# Patient Record
Sex: MALE | Race: WHITE | NOT HISPANIC OR LATINO | Employment: OTHER | URBAN - METROPOLITAN AREA
[De-identification: names, ages, dates, MRNs, and addresses within clinical notes are randomized per-mention and may not be internally consistent; named-entity substitution may affect disease eponyms.]

---

## 2018-04-28 ENCOUNTER — HOSPITAL ENCOUNTER (OUTPATIENT)
Dept: PREADMISSION TESTING | Facility: HOSPITAL | Age: 53
Discharge: HOME | End: 2018-04-28
Attending: SURGERY | Admitting: SURGERY

## 2018-04-28 LAB
ANION GAP SERPL CALC-SCNC: 11.8 MMOL/L (ref 10–20)
BUN SERPL-MCNC: 6 MG/DL (ref 8–20)
BUN/CREAT SERPL: 6.7 (ref 6.2–20.3)
CALCIUM SERPL-MCNC: 8.6 MG/DL (ref 8.9–10.3)
CHLORIDE SERPL-SCNC: 101 MMOL/L (ref 101–111)
CONV CO2: 25 MMOL/L (ref 22–32)
CREAT UR-MCNC: 0.9 MG/DL (ref 0.7–1.2)
GLUCOSE SERPL-MCNC: 101 MG/DL (ref 65–99)
POTASSIUM SERPL-SCNC: 3.8 MMOL/L (ref 3.6–5.1)
SODIUM SERPL-SCNC: 134 MMOL/L (ref 136–144)

## 2019-09-09 ENCOUNTER — APPOINTMENT (OUTPATIENT)
Dept: ULTRASOUND IMAGING | Facility: HOSPITAL | Age: 54
End: 2019-09-09

## 2019-09-09 ENCOUNTER — HOSPITAL ENCOUNTER (EMERGENCY)
Facility: HOSPITAL | Age: 54
Discharge: HOME | End: 2019-09-10
Attending: EMERGENCY MEDICINE | Admitting: EMERGENCY MEDICINE

## 2019-09-09 DIAGNOSIS — K40.91 RECURRENT RIGHT INGUINAL HERNIA: Primary | ICD-10-CM

## 2019-09-09 DIAGNOSIS — R33.9 URINARY RETENTION WITH INCOMPLETE BLADDER EMPTYING: ICD-10-CM

## 2019-09-09 LAB
BILIRUB UR QL STRIP: NEGATIVE
CLARITY UR: CLEAR
COLOR UR: YELLOW
GLUCOSE UR STRIP-MCNC: NEGATIVE MG/DL
HGB UR QL STRIP.AUTO: NEGATIVE
KETONES UR QL STRIP: NEGATIVE
LEUKOCYTE ESTERASE UR QL STRIP.AUTO: NEGATIVE
NITRITE UR QL STRIP: NEGATIVE
PH UR STRIP.AUTO: 6.5 [PH] (ref 5–8)
PROT UR QL STRIP: NEGATIVE
SP GR UR STRIP: 1.01 (ref 1–1.03)
UROBILINOGEN UR QL STRIP: NORMAL

## 2019-09-09 PROCEDURE — 99283 EMERGENCY DEPT VISIT LOW MDM: CPT

## 2019-09-09 PROCEDURE — 51798 US URINE CAPACITY MEASURE: CPT

## 2019-09-09 PROCEDURE — 76870 US EXAM SCROTUM: CPT

## 2019-09-09 PROCEDURE — 81003 URINALYSIS AUTO W/O SCOPE: CPT | Performed by: EMERGENCY MEDICINE

## 2019-09-10 VITALS
TEMPERATURE: 98.3 F | BODY MASS INDEX: 20.9 KG/M2 | SYSTOLIC BLOOD PRESSURE: 111 MMHG | RESPIRATION RATE: 18 BRPM | HEIGHT: 69 IN | WEIGHT: 141.09 LBS | OXYGEN SATURATION: 97 % | HEART RATE: 85 BPM | DIASTOLIC BLOOD PRESSURE: 71 MMHG

## 2019-09-10 RX ORDER — HYDROCODONE BITARTRATE AND ACETAMINOPHEN 10; 325 MG/1; MG/1
1 TABLET ORAL EVERY 6 HOURS PRN
Qty: 16 TABLET | Refills: 0 | Status: SHIPPED | OUTPATIENT
Start: 2019-09-10 | End: 2019-12-29

## 2019-09-10 NOTE — ED PROVIDER NOTES
Subjective   History of Present Illness  53-year-old male states that he was having intercourse 3 days ago felt something pop in the right groin area he states that he has had difficulties with urination but is not having dysuria or hematuria.  Patient has had a previous herniorrhaphy and he thinks that the hernias are recurrent.  Patient denies any fever or chills.  There is been no hematuria.  Review of Systems    No past medical history on file.    No Known Allergies    No past surgical history on file.    No family history on file.    Social History     Socioeconomic History   • Marital status:      Spouse name: Not on file   • Number of children: Not on file   • Years of education: Not on file   • Highest education level: Not on file           Objective   Physical Exam  On exam he is awake and alert he is afebrile vital signs are stable he has a scar in the right lower inguinal area from prior herniorrhaphy.  There is evidence of a recurrent right direct inguinal hernia which is not incarcerated at this point in time.  He has an old scar in the left groin from a prior herniorrhaphy.  There is tenderness in this area but I do not appreciate a direct hernia.  The patient has some tenderness over the right epididymis but no tenderness in the testicle itself.  There is no discoloration.  The patient has no drainage or discharge from the meatus.  Procedures           ED Course        Results for orders placed or performed during the hospital encounter of 09/09/19   Urinalysis With Microscopic If Indicated (No Culture) - Urine, Clean Catch   Result Value Ref Range    Color, UA Yellow Yellow, Straw    Appearance, UA Clear Clear    pH, UA 6.5 5.0 - 8.0    Specific Gravity, UA 1.007 1.005 - 1.030    Glucose, UA Negative Negative    Ketones, UA Negative Negative    Bilirubin, UA Negative Negative    Blood, UA Negative Negative    Protein, UA Negative Negative    Leuk Esterase, UA Negative Negative    Nitrite, UA  Negative Negative    Urobilinogen, UA 0.2 E.U./dL 0.2 - 1.0 E.U./dL     Medications - No data to display  Us Scrotum & Testicles    Result Date: 9/9/2019  1. Normal exam. Electronically signed by:  Pan Perdomo M.D.  9/9/2019 10:37 PM              MDM  The patient did not show evidence of epididymitis or scrotal pathology.  The patient did have a slight residual of 190 cc after voiding.  The patient does have evidence of a recurrent right inguinal hernia.  The patient states that he has talked to 1 of the general surgeons about a repair of the recurrent hernia and will follow up with him tomorrow.  The patient also is to follow-up with urology.  The patient is given hydrocodone to help with pain acutely and will be off work for 2 days.  Final diagnoses:   Recurrent right inguinal hernia   Urinary retention with incomplete bladder emptying              Jd Thrasher MD  09/10/19 0050

## 2019-09-10 NOTE — DISCHARGE INSTRUCTIONS
Off work Monday and Tuesday.  Call your general surgeon on Tuesday to set up an appointment to discuss hernia repair.  Hydrocodone for pain.  You should also [facility] to discuss the urinary retention

## 2019-12-26 ENCOUNTER — ON CAMPUS - OUTPATIENT (AMBULATORY)
Dept: URBAN - METROPOLITAN AREA HOSPITAL 2 | Facility: HOSPITAL | Age: 54
End: 2019-12-26
Payer: MEDICAID

## 2019-12-26 ENCOUNTER — OFFICE (AMBULATORY)
Dept: URBAN - METROPOLITAN AREA PATHOLOGY 4 | Facility: PATHOLOGY | Age: 54
End: 2019-12-26
Payer: MEDICAID

## 2019-12-26 VITALS
HEART RATE: 76 BPM | SYSTOLIC BLOOD PRESSURE: 119 MMHG | DIASTOLIC BLOOD PRESSURE: 77 MMHG | OXYGEN SATURATION: 98 % | HEART RATE: 87 BPM | RESPIRATION RATE: 16 BRPM | SYSTOLIC BLOOD PRESSURE: 96 MMHG | SYSTOLIC BLOOD PRESSURE: 92 MMHG | DIASTOLIC BLOOD PRESSURE: 74 MMHG | TEMPERATURE: 97.8 F | SYSTOLIC BLOOD PRESSURE: 121 MMHG | HEIGHT: 69 IN | HEART RATE: 100 BPM | HEART RATE: 97 BPM | DIASTOLIC BLOOD PRESSURE: 55 MMHG | DIASTOLIC BLOOD PRESSURE: 66 MMHG | DIASTOLIC BLOOD PRESSURE: 98 MMHG | HEART RATE: 85 BPM | HEART RATE: 89 BPM | DIASTOLIC BLOOD PRESSURE: 62 MMHG | WEIGHT: 153.4 LBS | OXYGEN SATURATION: 96 % | SYSTOLIC BLOOD PRESSURE: 108 MMHG | HEART RATE: 83 BPM | DIASTOLIC BLOOD PRESSURE: 64 MMHG | DIASTOLIC BLOOD PRESSURE: 76 MMHG | OXYGEN SATURATION: 97 % | DIASTOLIC BLOOD PRESSURE: 57 MMHG | SYSTOLIC BLOOD PRESSURE: 143 MMHG | SYSTOLIC BLOOD PRESSURE: 106 MMHG | HEART RATE: 92 BPM | SYSTOLIC BLOOD PRESSURE: 107 MMHG | OXYGEN SATURATION: 99 % | HEART RATE: 96 BPM | DIASTOLIC BLOOD PRESSURE: 70 MMHG

## 2019-12-26 DIAGNOSIS — K64.1 SECOND DEGREE HEMORRHOIDS: ICD-10-CM

## 2019-12-26 DIAGNOSIS — D12.5 BENIGN NEOPLASM OF SIGMOID COLON: ICD-10-CM

## 2019-12-26 DIAGNOSIS — Z80.0 FAMILY HISTORY OF MALIGNANT NEOPLASM OF DIGESTIVE ORGANS: ICD-10-CM

## 2019-12-26 DIAGNOSIS — R19.4 CHANGE IN BOWEL HABIT: ICD-10-CM

## 2019-12-26 LAB
GI HISTOLOGY: A. UNSPECIFIED: (no result)
GI HISTOLOGY: PDF REPORT: (no result)

## 2019-12-26 PROCEDURE — 45380 COLONOSCOPY AND BIOPSY: CPT | Performed by: INTERNAL MEDICINE

## 2019-12-26 PROCEDURE — 88305 TISSUE EXAM BY PATHOLOGIST: CPT | Performed by: INTERNAL MEDICINE

## 2019-12-29 ENCOUNTER — HOSPITAL ENCOUNTER (EMERGENCY)
Facility: HOSPITAL | Age: 54
Discharge: HOME | End: 2019-12-29
Admitting: EMERGENCY MEDICINE

## 2019-12-29 VITALS
DIASTOLIC BLOOD PRESSURE: 74 MMHG | WEIGHT: 153 LBS | HEIGHT: 69 IN | TEMPERATURE: 98.6 F | OXYGEN SATURATION: 98 % | BODY MASS INDEX: 22.66 KG/M2 | HEART RATE: 111 BPM | RESPIRATION RATE: 20 BRPM | SYSTOLIC BLOOD PRESSURE: 129 MMHG

## 2019-12-29 DIAGNOSIS — M79.642 LEFT HAND PAIN: Primary | ICD-10-CM

## 2019-12-29 PROCEDURE — 99283 EMERGENCY DEPT VISIT LOW MDM: CPT

## 2019-12-29 PROCEDURE — 25010000002 HYDROMORPHONE PER 4 MG: Performed by: EMERGENCY MEDICINE

## 2019-12-29 PROCEDURE — 96372 THER/PROPH/DIAG INJ SC/IM: CPT

## 2019-12-29 RX ORDER — HYDROCODONE BITARTRATE AND ACETAMINOPHEN 10; 325 MG/1; MG/1
1 TABLET ORAL EVERY 6 HOURS PRN
Qty: 12 TABLET | Refills: 0 | Status: SHIPPED | OUTPATIENT
Start: 2019-12-29 | End: 2020-05-19

## 2019-12-29 RX ORDER — TAMSULOSIN HYDROCHLORIDE 0.4 MG/1
1 CAPSULE ORAL DAILY
COMMUNITY

## 2019-12-29 RX ORDER — ONDANSETRON 4 MG/1
4 TABLET, ORALLY DISINTEGRATING ORAL ONCE
Status: COMPLETED | OUTPATIENT
Start: 2019-12-29 | End: 2019-12-29

## 2019-12-29 RX ORDER — HYDROMORPHONE HCL 110MG/55ML
0.5 PATIENT CONTROLLED ANALGESIA SYRINGE INTRAVENOUS ONCE
Status: COMPLETED | OUTPATIENT
Start: 2019-12-29 | End: 2019-12-29

## 2019-12-29 RX ADMIN — HYDROMORPHONE HYDROCHLORIDE 0.5 MG: 2 INJECTION, SOLUTION INTRAMUSCULAR; INTRAVENOUS; SUBCUTANEOUS at 13:09

## 2019-12-29 RX ADMIN — ONDANSETRON 4 MG: 4 TABLET, ORALLY DISINTEGRATING ORAL at 13:09

## 2020-01-08 ENCOUNTER — TRANSCRIBE ORDERS (OUTPATIENT)
Dept: PHYSICAL THERAPY | Facility: CLINIC | Age: 55
End: 2020-01-08

## 2020-01-08 DIAGNOSIS — S61.211A LACERATION OF LEFT INDEX FINGER, FOREIGN BODY PRESENCE UNSPECIFIED, NAIL DAMAGE STATUS UNSPECIFIED, INITIAL ENCOUNTER: Primary | ICD-10-CM

## 2020-01-15 ENCOUNTER — TREATMENT (OUTPATIENT)
Dept: PHYSICAL THERAPY | Facility: CLINIC | Age: 55
End: 2020-01-15

## 2020-01-15 DIAGNOSIS — S61.422D LACERATION OF LEFT HAND WITH FOREIGN BODY, SUBSEQUENT ENCOUNTER: ICD-10-CM

## 2020-01-15 DIAGNOSIS — M20.002 DEFORMITY OF FINGER OF LEFT HAND: Primary | ICD-10-CM

## 2020-01-15 PROCEDURE — 97166 OT EVAL MOD COMPLEX 45 MIN: CPT | Performed by: OCCUPATIONAL THERAPIST

## 2020-01-15 NOTE — PROGRESS NOTES
Occupational Therapy Initial Evaluation    Patient: Eddi Richard   : 1965  Diagnosis/ICD-10 Code:  Deformity of finger of left hand [M20.002]  Referring practitioner: Damien Felix MD  Date of Initial Visit: 1/15/2020  Today's Date: 1/15/2020  Patient seen for 1 sessions               Subjective Questionnaire: 97      Subjective pt is a 54 yr old male    He lacerated his left hand his index, middle and ring finger with a table saw    He was seen by [facility]    He is in a dorsal splint  Here is here today for therapy   The pt is very anxious and several times was tearful    He was restless during treatment and had to be told several times to calm down and breathe     Objective   The pt is right hand dominate left hand affected   He cut the digits with a table saw so there are foreign bodies ; why the lacerations were not sutured    The lacerations are open and extend from the lateral aspect of the digits to past midline  The pt does have them dressed  Theres areas are open as there was no suturing   He is very hypersensitive to touch on these affected digits   He is to remain in the dorsal splint   Discussed with patient why the areas where not sutured   --why he is not to soak the hand but to keep the area clean -due to dirt , etc in the water if you soak   --why he is to wear the splint -as to not stretch the ligaments ,tendons   --discussed increasing protein intake for better wound healing     He demonstrates shoulder elbow full ROM-- discussed continue reaching and ranging the shoulder and elbow       Assessment & Plan     Assessment  Assessment details: At this time pt is limited in all functional use of his hand   He needs assist in dressing and doing simple ADL tasks  Barriers to therapy: open laceration, and extreme nervouosness and hypersensitivity   Prognosis: good    Goals  Plan Goals: STG  Reinforce,add strapping, and padding the dorsal splint as areas are weakened   STG  continue educating as to the pt responsibilities for care of his hand     LTG  Continue cleaning of hand and area   LTG  Gentle flexion and ranging of the LUE     Pt is indicated for skilled OT       Plan  Planned therapy interventions: ADL retraining, dressing changes, functional ROM exercises, home exercise program, motor coordination training and therapeutic activities  Frequency: 2x week  Plan details: 30        History # of Personal Factors and/or Comorbidities: MODERATE (1-2)  Examination of Body System(s): # of elements: HIGH (4+)  Clinical Presentation: EVOLVING  Clinical Decision Making: HIGH      Timed Codes        Manual Therapy:         mins  91205;    Therapeutic Exercise:         mins  05239;     Neuromuscular Lizette:        mins  32512;    Therapeutic Activity:          mins  39733;      Ultrasound:          mins  19069;    Community/ work         mins  90800  Self Care/ [place]         mins  70137  Sensory Re-Int.                  mins   32747  Cognitve Re-train         mins  28596     Un-timed Codes  Electrical Stimulation:         mins 9 57398 ( );  OT low eval          mins  49746  OT mod eval.                   mins   72689  OT high eval     60     mins 26995        Timed Treatment:   0   mins   Total Treatment:     60   mins    OT SIGNATURE: Duyen Smalls OT   DATE TREATMENT INITIATED: 1/15/2020   INITIAL CERTIFICATION       Certification Period: 4/14/2020  I certify that the therapy services are furnished while this patient is under my care.  The services outlined above are required by this patient, and will be reviewed every 90 days.     PHYSICIAN: Damien Monaco MD      DATE:     Please sign and return via fax to [phone].. Thank you, [facility] Occupational Therapy.

## 2020-01-16 ENCOUNTER — TRANSCRIBE ORDERS (OUTPATIENT)
Dept: PHYSICAL THERAPY | Facility: CLINIC | Age: 55
End: 2020-01-16

## 2020-01-31 ENCOUNTER — TREATMENT (OUTPATIENT)
Dept: PHYSICAL THERAPY | Facility: CLINIC | Age: 55
End: 2020-01-31

## 2020-01-31 DIAGNOSIS — S61.422D LACERATION OF LEFT HAND WITH FOREIGN BODY, SUBSEQUENT ENCOUNTER: ICD-10-CM

## 2020-01-31 DIAGNOSIS — M20.002 DEFORMITY OF FINGER OF LEFT HAND: Primary | ICD-10-CM

## 2020-01-31 PROCEDURE — 97110 THERAPEUTIC EXERCISES: CPT | Performed by: OCCUPATIONAL THERAPIST

## 2020-01-31 PROCEDURE — 97140 MANUAL THERAPY 1/> REGIONS: CPT | Performed by: OCCUPATIONAL THERAPIST

## 2020-01-31 PROCEDURE — 97530 THERAPEUTIC ACTIVITIES: CPT | Performed by: OCCUPATIONAL THERAPIST

## 2020-01-31 NOTE — PROGRESS NOTES
OccupationalTherapy Daily Progress Note        Patient: Eddi Richard   : 1965  Diagnosis/ICD-10 Code:  Deformity of finger of left hand [M20.002]  Referring practitioner: Damien Felix MD  Date of Initial Visit: Type: THERAPY  Noted: 1/15/2020  Today's Date: 2020  Patient seen for 2 sessions               Subjective   First time back since evaluation due to waiting on insurance coverage    Pt has taken a foot brace and make a dorsal blocking splint     Objective   Wounds are healing and much less sensitive   Pt dresses wounds daily    Following protocol of joint  Pull individual and dorsal blocking   Pt flexing and fingers are moving radially   Hand in intrinsic plus position   See Exercise, Manual, and Modality Logs for complete treatment.   ROM   90o across mcp   Pip 85 index 70 mid  85 ring   Dip approximately 20 degree of motion across     Assessment/Plan    Progress per Plan of Care           Timed Codes        Manual Therapy:    15     mins  79492;    Therapeutic Exercise:    15     mins  76333;     Neuromuscular Lizette:        mins  16418;    Therapeutic Activity:    15      mins  63233;      Ultrasound:          mins  13563;    Community/ work         mins  93604  Self Care/ [place]         mins  93320  Sensory Re-Int.                  mins   46372  Cognitve Re-train         mins  03123     Un-timed Codes  Electrical Stimulation:         mins 9 92118 ( );      Timed Treatment:  45   mins   Total Treatment:     45   mins    Duyen Smalls OT  Occupational Therapist

## 2020-02-05 ENCOUNTER — TREATMENT (OUTPATIENT)
Dept: PHYSICAL THERAPY | Facility: CLINIC | Age: 55
End: 2020-02-05

## 2020-02-05 DIAGNOSIS — S61.422D LACERATION OF LEFT HAND WITH FOREIGN BODY, SUBSEQUENT ENCOUNTER: ICD-10-CM

## 2020-02-05 DIAGNOSIS — M20.002 DEFORMITY OF FINGER OF LEFT HAND: Primary | ICD-10-CM

## 2020-02-05 PROCEDURE — 97112 NEUROMUSCULAR REEDUCATION: CPT | Performed by: OCCUPATIONAL THERAPIST

## 2020-02-05 PROCEDURE — 97530 THERAPEUTIC ACTIVITIES: CPT | Performed by: OCCUPATIONAL THERAPIST

## 2020-02-05 NOTE — PROGRESS NOTES
OccupationalTherapy Daily Progress Note        Patient: Eddi Richard   : 1965  Diagnosis/ICD-10 Code:  Deformity of finger of left hand [M20.002]  Referring practitioner: Damien Felix MD  Date of Initial Visit: Type: THERAPY  Noted: 1/15/2020  Today's Date: 2020  Patient seen for 3 sessions         }      Subjective  Pt arrived late    Pt wearing the dorsal splint    Wound are well healing and pt appears to be taking care of his wounds and doing exercises     Objective   Following the protocol as directed by   Week 7 add isolated blocking exercises to facilitate FDS and FDP gliding exercises   With a graded level of aggressiveness  of pull through        Assessment/Plan    Progress per Plan of Care           Timed Codes        Manual Therapy:         mins  30346;    Therapeutic Exercise:    mins  23228;     Neuromuscular Lizette:   15     mins  47023;    Therapeutic Activity:     15     mins  45333;      Ultrasound:          mins  38987;    Community/ work         mins  03195  Self Care/ [place]         mins  65272  Sensory Re-Int.                  mins   29245  Cognitve Re-train        mins  63937     Un-timed Codes  Electrical Stimulation:        mins 9 86306 ( );      3   mins   Total Treatment:    30    mins    Duyen Smalls OT  Occupational Therapist

## 2020-02-07 ENCOUNTER — TREATMENT (OUTPATIENT)
Dept: PHYSICAL THERAPY | Facility: CLINIC | Age: 55
End: 2020-02-07

## 2020-02-07 DIAGNOSIS — M20.002 DEFORMITY OF FINGER OF LEFT HAND: Primary | ICD-10-CM

## 2020-02-07 DIAGNOSIS — S61.422D LACERATION OF LEFT HAND WITH FOREIGN BODY, SUBSEQUENT ENCOUNTER: ICD-10-CM

## 2020-02-07 PROCEDURE — 97112 NEUROMUSCULAR REEDUCATION: CPT | Performed by: OCCUPATIONAL THERAPIST

## 2020-02-07 PROCEDURE — 97110 THERAPEUTIC EXERCISES: CPT | Performed by: OCCUPATIONAL THERAPIST

## 2020-02-07 NOTE — PROGRESS NOTES
OccupationalTherapy Daily Progress Note        Patient: Eddi Richard   : 1965  Diagnosis/ICD-10 Code:  Deformity of finger of left hand [M20.002]  Referring practitioner: Damien Felix MD  Date of Initial Visit: Type: THERAPY  Noted: 1/15/2020  Today's Date: 2020  Patient seen for 4 sessions                 Subjective  I am getting more feeling at my fingertips   Objective   Following protocol for week 6 of flexor tendon laceration   Wounds well healed     MCP flex 90   Pip flex 90   Dip of affected digits 30   Pt appears to be wearing and following DBS protocol   Range actively the LUE       Assessment/Plan    Progress per Plan of Care           Timed Codes        Manual Therapy:       mins  88044;    Therapeutic Exercise:   15    mins  03680;     Neuromuscular Lizette:    15    mins  59968;    Therapeutic Activity:          mins  65202;      Ultrasound:          mins  68262;    Community/ work         mins  00032  Self Care/ [place]         mins  10128  Sensory Re-Int.                  mins   48388  Cognitve Re-train         mins  23608     Un-timed Codes  Electrical Stimulation:         mins 9 88175 ( );      Timed Treatment:   30   mins   Total Treatment:     30   mins    Duyen Smalls OT  Occupational Therapist

## 2020-02-14 ENCOUNTER — TREATMENT (OUTPATIENT)
Dept: PHYSICAL THERAPY | Facility: CLINIC | Age: 55
End: 2020-02-14

## 2020-02-14 DIAGNOSIS — S61.422D LACERATION OF LEFT HAND WITH FOREIGN BODY, SUBSEQUENT ENCOUNTER: ICD-10-CM

## 2020-02-14 DIAGNOSIS — M20.002 DEFORMITY OF FINGER OF LEFT HAND: Primary | ICD-10-CM

## 2020-02-14 PROCEDURE — 97140 MANUAL THERAPY 1/> REGIONS: CPT | Performed by: OCCUPATIONAL THERAPIST

## 2020-02-14 PROCEDURE — 97112 NEUROMUSCULAR REEDUCATION: CPT | Performed by: OCCUPATIONAL THERAPIST

## 2020-02-14 NOTE — PROGRESS NOTES
OccupationalTherapy Daily Progress Note        Patient: Eddi Richard   : 1965  Diagnosis/ICD-10 Code:  Deformity of finger of left hand [M20.002]  Referring practitioner: Damien Felix MD  Date of Initial Visit: Type: THERAPY  Noted: 1/15/2020  Today's Date: 2020  Patient seen for 5 sessions           Subjective  Wounds continue with healing and scab slowly dissolving   Objective   Good range at all joints on hand   Edema is reducing   Good complete fist with dip flexion   .   Treatment today involved a lot of manual therapy for edema reduction and flexion stretch and fist closure   Manipulation and functional use of hand       Assessment/Plan    Progress per Plan of Care           Timed Codes        Manual Therapy:    15     mins  32859;    Therapeutic Exercise:         mins  27221;     Neuromuscular Lizette:    15    mins  51869;    Therapeutic Activity:          mins  67785;      Ultrasound:          mins  40905;    Community/ work         mins  07701  Self Care/ [place]         mins  17495  Sensory Re-Int.                  mins   90989  Cognitve Re-train         mins  09321     Un-timed Codes  Electrical Stimulation:         mins 9 87695 ( );      Timed Treatment:   30   mins   Total Treatment:     30   mins    Duyen Smalls OT  Occupational Therapist

## 2020-02-19 ENCOUNTER — TREATMENT (OUTPATIENT)
Dept: PHYSICAL THERAPY | Facility: CLINIC | Age: 55
End: 2020-02-19

## 2020-02-19 DIAGNOSIS — M20.002 DEFORMITY OF FINGER OF LEFT HAND: Primary | ICD-10-CM

## 2020-02-19 DIAGNOSIS — S61.422D LACERATION OF LEFT HAND WITH FOREIGN BODY, SUBSEQUENT ENCOUNTER: ICD-10-CM

## 2020-02-19 PROCEDURE — 97530 THERAPEUTIC ACTIVITIES: CPT | Performed by: OCCUPATIONAL THERAPIST

## 2020-02-19 PROCEDURE — 97110 THERAPEUTIC EXERCISES: CPT | Performed by: OCCUPATIONAL THERAPIST

## 2020-02-19 PROCEDURE — 97140 MANUAL THERAPY 1/> REGIONS: CPT | Performed by: OCCUPATIONAL THERAPIST

## 2020-02-19 NOTE — PROGRESS NOTES
OccupationalTherapy  Progress Note        Patient: Eddi Richard   : 1965  Diagnosis/ICD-10 Code:  Deformity of finger of left hand [M20.002]  Referring practitioner: Damien Felix MD  Date of Initial Visit: Type: THERAPY  Noted: 1/15/2020  Today's Date: 2020  Patient seen for 6 sessions         Eddi Richard reports: I only wear my splint when I'm out in public     Quick dash questionnaire   --initial visit was 97%    Today pt reports he feels he is much improved at 70%      Subjective   Pt displays well healed wounds with no scabbing  There are areas that appear like small abrasions on the 2nd and ring finger        Pt reports the areas to the lateral sides of the 2nd and ring finger remain hypersensitive and these areas prevent him from really using his hand daily functional tasks     Pt demonstrates good flexion and is almost a complete fist at the DIP   Extension is WFL with only slight DIP lag        Objective   MCP across the left hand are WFL 85-90 flexion and extension at 30  Pip across the hand ranging from  they demonstrate functional range --extension slight extensor lag at 5-8 degree   Dip are lagging in flexion at 40-50 flexion extension at -10  Shoulder and elbow and wrist are WFL for range     Pt has not been using his hand in normal  ADL or IADL due to wounds and sensitivity issues so there is MMT weakness        Assessment/Plan   Continue following protocol for extension and gentle strengthening and dexterity     Progress per Plan of Care           Timed Codes        Manual Therapy:    15     mins  46211;    Therapeutic Exercise:    15     mins  03739;     Neuromuscular Lizette:        mins  22742;    Therapeutic Activity:     15     mins  93141;      Ultrasound:          mins  57413;    Community/ work         mins  10039  Self Care/ [place]         mins  53079  Sensory Re-Int.                  mins   37186  Cognitve Re-train        mins  12547     Un-timed  Codes  Electrical Stimulation:         mins 9 63797 ( );      Timed Treatment:   45   mins   Total Treatment:     45   mins    Duyen Smalls OT  Occupational Therapist

## 2020-05-06 ENCOUNTER — TREATMENT (OUTPATIENT)
Dept: PHYSICAL THERAPY | Facility: CLINIC | Age: 55
End: 2020-05-06

## 2020-05-06 DIAGNOSIS — M20.002 DEFORMITY OF FINGER OF LEFT HAND: Primary | ICD-10-CM

## 2020-05-06 DIAGNOSIS — S61.422D LACERATION OF LEFT HAND WITH FOREIGN BODY, SUBSEQUENT ENCOUNTER: ICD-10-CM

## 2020-05-06 PROCEDURE — 97110 THERAPEUTIC EXERCISES: CPT | Performed by: OCCUPATIONAL THERAPIST

## 2020-05-06 PROCEDURE — 97140 MANUAL THERAPY 1/> REGIONS: CPT | Performed by: OCCUPATIONAL THERAPIST

## 2020-05-06 PROCEDURE — 97530 THERAPEUTIC ACTIVITIES: CPT | Performed by: OCCUPATIONAL THERAPIST

## 2020-05-06 NOTE — PROGRESS NOTES
OccupationalTherapy Daily Progress Note        Patient: Eddi Richard   : 1965  Diagnosis/ICD-10 Code:  Deformity of finger of left hand [M20.002]  Referring practitioner: Damien Felix MD  Date of Initial Visit: Type: THERAPY  Noted: 1/15/2020  Today's Date: 2020  Patient seen for 7 sessions               Subjective pt has been out of town for over a month caring for relatives    He returns today stating that his fingers are still numb and hypersensitive    The wounds are well healed     Objective   Pt is right hand dominate left affected   He demonstrates full closure and extension   He reports that after several times opening closing his hand his hand begins to tremble   Prehension is WFL   strength is somewhat diminished but functional   See Exercise, Manual, and Modality Logs for complete treatment.       Assessment/Plan    Progress per Plan of Care           Timed Codes        Manual Therapy:   30    mins  89803;    Therapeutic Exercise:    15     mins  95838;     Neuromuscular Lizette:        mins  37862;    Therapeutic Activity:     15     mins  59524;      Ultrasound:          mins  08306;    Community/ work         mins  13793  Self Care/ [place]         mins  39606  Sensory Re-Int.                  mins   14901  Cognitve Re-train         mins  98841     Un-timed Codes  Electrical Stimulation:         mins 9 90918 ( );      Timed Treatment: 60     mins   Total Treatment:    60    mins    Duyen Smalls OT  Occupational Therapist

## 2020-05-19 ENCOUNTER — OFFICE VISIT (OUTPATIENT)
Dept: PODIATRY | Facility: CLINIC | Age: 55
End: 2020-05-19

## 2020-05-19 VITALS
BODY MASS INDEX: 23.4 KG/M2 | WEIGHT: 158 LBS | SYSTOLIC BLOOD PRESSURE: 118 MMHG | HEIGHT: 69 IN | TEMPERATURE: 97.6 F | DIASTOLIC BLOOD PRESSURE: 76 MMHG | HEART RATE: 83 BPM

## 2020-05-19 DIAGNOSIS — S92.355A CLOSED NONDISPLACED FRACTURE OF FIFTH METATARSAL BONE OF LEFT FOOT, INITIAL ENCOUNTER: Primary | ICD-10-CM

## 2020-05-19 PROCEDURE — 97760 ORTHOTIC MGMT&TRAING 1ST ENC: CPT | Performed by: PODIATRIST

## 2020-05-19 PROCEDURE — 99203 OFFICE O/P NEW LOW 30 MIN: CPT | Performed by: PODIATRIST

## 2020-05-19 RX ORDER — IBUPROFEN 200 MG
600 TABLET ORAL
COMMUNITY

## 2020-05-19 RX ORDER — CYCLOBENZAPRINE HCL 10 MG
10 TABLET ORAL
COMMUNITY

## 2020-05-19 NOTE — PROGRESS NOTES
05/19/2020  Foot and Ankle Surgery - New Patient   Provider: Dr. Andrei Sarmiento DPM  Location: [facility]    Subjective:  Eddi Richard is a 54 y.o. male.     Chief Complaint   Patient presents with   • Left Foot - Pain, Consult       HPI: Patient is a 54-year-old male that was referred to me from the ED after an injury last week.  Patient describes an inversion injury affecting the left lower extremity.  He states that he was coming down steps when the injury occurred.  He localizes the pain to the lateral column of the foot.  Previous imaging was performed concerning for fifth metatarsal fracture.  He was placed into a posterior splint and instructed on nonweightbearing.  He continues to have discomfort which has improved mildly.  He denies any previous injuries to the left lower extremity.  He rates his symptoms a 7 out of 10 today.    No Known Allergies    Past Medical History:   Diagnosis Date   • Back injury        Past Surgical History:   Procedure Laterality Date   • HAND SURGERY Left        Family History   Problem Relation Age of Onset   • Heart disease Mother    • Hypertension Mother    • Heart disease Sister    • Hypertension Sister        Social History     Socioeconomic History   • Marital status:      Spouse name: Not on file   • Number of children: Not on file   • Years of education: Not on file   • Highest education level: Not on file   Tobacco Use   • Smoking status: Current Every Day Smoker     Packs/day: 1.00     Types: Cigarettes   • Smokeless tobacco: Never Used   Substance and Sexual Activity   • Alcohol use: Yes   • Drug use: Defer   • Sexual activity: Defer        Current Outpatient Medications on File Prior to Visit   Medication Sig Dispense Refill   • cyclobenzaprine (FLEXERIL) 10 MG tablet Take 10 mg by mouth.     • ibuprofen (ADVIL,MOTRIN) 200 MG tablet Take 600 mg by mouth.     • tamsulosin (FLOMAX) 0.4 MG capsule 24 hr capsule Take 1 capsule by mouth Daily.     •  "[DISCONTINUED] HYDROcodone-acetaminophen (NORCO)  MG per tablet Take 1 tablet by mouth Every 6 (Six) Hours As Needed for Moderate Pain . 12 tablet 0     No current facility-administered medications on file prior to visit.        Review of Systems:  General: Denies fever, chills, fatigue, and weakness.  Eyes: Denies vision loss, blurry vision, and excessive redness.  ENT: Denies hearing issues and difficulty swallowing.  Cardiovascular: Denies palpitations, chest pain, or syncopal episodes.  Respiratory: Denies shortness of breath, wheezing, and coughing.  GI: Denies abdominal pain, nausea, and vomiting.   : Denies frequency, hematuria, and urgency.  Musculoskeletal: + Left foot pain  Derm: Denies rash, open wounds, or suspicious lesions.  Neuro: Denies headaches, numbness, loss of coordination, and tremors.  Psych: Denies anxiety and depression.  Endocrine: Denies temperature intolerance and changes in appetite.  Heme: Denies bleeding disorders or abnormal bruising.     Objective   /76   Pulse 83   Temp 97.6 °F (36.4 °C) (Oral)   Ht 175.3 cm (69\")   Wt 71.7 kg (158 lb)   BMI 23.33 kg/m²     Foot/Ankle Exam:       General:   Appearance: appears stated age and healthy    Orientation: AAOx3    Affect: appropriate    Gait: antalgic      VASCULAR      Left Foot Vascularity   Normal vascular exam    Dorsalis pedis:  2+  Posterior tibial:  2+  Skin Temperature: warm    Edema Gradin+  CFT:  < 3 seconds  Pedal Hair Growth:  Present      NEUROLOGIC     Left Foot Neurologic   Light touch sensation:  Normal  Hot/cold sensation: normal    Achilles reflex:  2+     MUSCULOSKELETAL      Left Foot Musculoskeletal   Ecchymosis:  5th metatarsal base  Tenderness: fifth metatarsal base    Arch:  Normal     MUSCLE STRENGTH     Left Foot Muscle Strength   Foot dorsiflexion:  5  Foot plantar flexion:  5  Foot inversion:  5  Foot eversion:  5     DERMATOLOGIC     Left Foot Dermatologic   Skin: skin intact        Left " Foot Additional Comments: Range of motion muscle strength testing deferred secondary to guarding.  No gross deformity or instability.  Prominent discomfort with palpation to the lateral column of the foot.  No discomfort to the ankle or proximal fibula.      Assessment/Plan   Eddi was seen today for pain and consult.    Diagnoses and all orders for this visit:    Closed nondisplaced fracture of fifth metatarsal bone of left foot, initial encounter  -     XR Foot 3+ View Left      Patient presents approximately 1 week after injury to the left foot.  Imaging was obtained and reviewed today showing a nondisplaced fifth metatarsal base fracture.  I did review the imaging, diagnosis, and treatment options at length with the patient.  I have asked that he transition to a cam boot.  I did dispense a boot and spent greater than 15 minutes reviewing the proper use and effects.  I would like him to remain nonweightbearing for the next 2 weeks and then gradually transition to protected weightbearing as tolerated in the cam boot.  I would like him to start gentle range of motion exercises.  We did discuss rice therapy.  I will see him in 3 weeks for reevaluation and imaging.    Orders Placed This Encounter   Procedures   • XR Foot 3+ View Left     Order Specific Question:   Reason for Exam:     Answer:   Left foot FX from 5/11/20 RM 14 NWB        Note is dictated utilizing voice recognition software. Unfortunately this leads to occasional typographical errors. I apologize in advance if the situation occurs. If questions occur please do not hesitate to call our office.

## 2020-05-22 ENCOUNTER — TREATMENT (OUTPATIENT)
Dept: PHYSICAL THERAPY | Facility: CLINIC | Age: 55
End: 2020-05-22

## 2020-05-22 DIAGNOSIS — S61.422D LACERATION OF LEFT HAND WITH FOREIGN BODY, SUBSEQUENT ENCOUNTER: ICD-10-CM

## 2020-05-22 DIAGNOSIS — M20.002 DEFORMITY OF FINGER OF LEFT HAND: Primary | ICD-10-CM

## 2020-05-22 PROCEDURE — 97530 THERAPEUTIC ACTIVITIES: CPT | Performed by: OCCUPATIONAL THERAPIST

## 2020-05-22 PROCEDURE — 97110 THERAPEUTIC EXERCISES: CPT | Performed by: OCCUPATIONAL THERAPIST

## 2020-05-22 PROCEDURE — 97140 MANUAL THERAPY 1/> REGIONS: CPT | Performed by: OCCUPATIONAL THERAPIST

## 2020-05-22 NOTE — PROGRESS NOTES
OccupationalTherapy Daily Progress Note        Patient: Eddi Richard   : 1965  Diagnosis/ICD-10 Code:  Deformity of finger of left hand [M20.002]  Referring practitioner: Damien Felix MD  Date of Initial Visit: Type: THERAPY  Noted: 1/15/2020  Today's Date: 2020  Patient seen for 8 sessions            Subjective  Pt is here with crutches and a L walking boot    Pt reporting that he was visiting relatives in [place] when he tripped and fell on front walk and has fx across his L metatarsals    He is to be non -wt bearing for the next 6-8 weeks   He also reports that his fingers on the left hand that we have been treating are super hypersensitive   He also has paperwork for this therapist as he is applying for disability and suffers from PTSD due to his hand lacerations   Therapist is unable to do said paperwork as it is for a mental health professional to fill out    Pt would like this therapist to write a letter on his behalf   Objective   Pt has bandaids over the L ring and middle fingers at the PIP area which is still somewhat hypersensitive and noted scar healing    His hands however are dirty and it is obvious that he has been working with his hands     See Exercise, Manual, and Modality Logs for complete treatment.   Manual work  Scar massage and desensitization   Gripping tasks and dexterity tasks     Pt performed all without issue or pain report or hypersensitivity reaction       Assessment/Plan    Progress per Plan of Care           Timed Codes        Manual Therapy:    30     mins  32986;    Therapeutic Exercise:    15     mins  92549;     Neuromuscular Lizette:        mins  16286;    Therapeutic Activity:     15     mins  01521;      Ultrasound:          mins  27735;    Community/ work         mins  63965  Self Care/ [place]         mins  02240  Sensory Re-Int.                  mins   06803  Cognitve Re-train         mins  02380     Un-timed Codes  Electrical Stimulation:         mins  9 22406 ( );      Timed Treatment:   60   mins   Total Treatment:    60   mins    Duyen Smalls OT  Occupational Therapist

## 2020-05-29 ENCOUNTER — TREATMENT (OUTPATIENT)
Dept: PHYSICAL THERAPY | Facility: CLINIC | Age: 55
End: 2020-05-29

## 2020-05-29 DIAGNOSIS — S61.422D LACERATION OF LEFT HAND WITH FOREIGN BODY, SUBSEQUENT ENCOUNTER: ICD-10-CM

## 2020-05-29 DIAGNOSIS — M20.002 DEFORMITY OF FINGER OF LEFT HAND: Primary | ICD-10-CM

## 2020-05-29 PROCEDURE — 97530 THERAPEUTIC ACTIVITIES: CPT | Performed by: OCCUPATIONAL THERAPIST

## 2020-05-29 PROCEDURE — 97140 MANUAL THERAPY 1/> REGIONS: CPT | Performed by: OCCUPATIONAL THERAPIST

## 2020-05-29 PROCEDURE — 97110 THERAPEUTIC EXERCISES: CPT | Performed by: OCCUPATIONAL THERAPIST

## 2020-05-29 PROCEDURE — 97112 NEUROMUSCULAR REEDUCATION: CPT | Performed by: OCCUPATIONAL THERAPIST

## 2020-05-29 NOTE — PROGRESS NOTES
Re-Assessment / Re-Certification/ MD Note        Patient: Eddi Richard   : 1965  Diagnosis/ICD-10 Code:  Deformity of finger of left hand [M20.002]  Referring practitioner: Damien Felix MD  Date of Initial Visit: Type: THERAPY  Noted: 1/15/2020  Today's Date: 2020  Patient seen for 9 sessions      Subjective:   Eddi Richard reports: my fingers are still sensitive     Subjective Questionnaire: QuickDASH: previous 97% upon initial evaluation   At this time 45%  Clinical Progress: improved  Home Program Compliance: Yes  Treatment has included: therapeutic exercise, neuromuscular re-education, manual therapy and therapeutic activity    Subjective  My hand is better however they are quite sensitive    I also become very anxious around power equipment   Objective pt is a 54 yr old male    He is a right hand dominate male with left hand affected   At this time the wounds and the incisions are well healed   There is some redness along the incision line on the ring and middle finger    It is these areas are the areas that the patient states are hypersensitive   He has had another injury since his initial evaluation    He was visiting and assisting relatives in some home maintenance when he fell on an uneven walkway and reported fractured several of his metatarsals on the Left foot , he is in a walking boot and was told to be non wt bearing    Range of motion of the ring and middle fingers on the left are WFL with   MCP at 90 degree  PIP at 75 degree  DIP at 55 degree      on the right is 110lbs      Left affected is 45 lbs    Prehension     Right                       Left    20   Lateral pinch   19    18    Tip            3     22    3pt        7        Circumferential measurements of the ring DIP is 4 1/2 cm  - PIP is 5 1/2 cm incision is in the no man zone area with mild deformity   Middle circumferential is DIP is 5 1/2 cm and the PIP is 6cm   Dexterity  Is WFL      Assessment & Plan      Assessment  Impairments: abnormal coordination, activity intolerance and pain with function  Prognosis: good  Functional Limitations: carrying objects, lifting and pulling      Progress toward previous goals: All Met    Previous Goals      STG  Reinforce,add strapping, and padding the dorsal splint as areas are weakened   STG continue educating as to the pt responsibilities for care of his hand and home exercise program     LTG  Continue cleaning of hand and area   LTG  Gentle flexion and ranging of the LUE    LTG:   Follow with wound dressing, healing     Current   Short-term goals  De-sensitization of areas with home program     Long-term goals Improve  strength to within functional limits for his age group at 51 kg   Long term goals   Improve prehensile strength  Long term goals     Improve sustained  and repetitive use  Long term goals    Improve bilateral integration         Recommendations: Continue as planned  Timeframe: 6 weeks  Prognosis to achieve goals: good    OT Signature: Duyen Smalls OT      Based upon review of the patient's progress and continued therapy plan, it is my medical opinion that Eddi Richard should continue occupational therapy treatment at [facility] [address] [facility]  [address] [facility] [place] [address].    Signature: __________________________________  Damien Monaco MD  Please sign and return via fax to [phone].. Thank you, [facility].      Timed Codes        Manual Therapy:    15    mins  50894;    Therapeutic Exercise:    15     mins  41093;     Neuromuscular Lizette:    15    mins  74349;    Therapeutic Activity:     15     mins  58183;      Ultrasound:          mins  82693;    Community/ work         mins  32232  Self Care/ Stanley         mins  95012  Sensory Re-Int.                  mins   96578  Cognitve Re-train         mins  63120     Un-timed Codes  Electrical Stimulation:         mins 9 94171  ( );    Timed Treatment:   60   mins   Total Treatment:    60    mins

## 2020-06-01 ENCOUNTER — TREATMENT (OUTPATIENT)
Dept: PHYSICAL THERAPY | Facility: CLINIC | Age: 55
End: 2020-06-01

## 2020-06-01 DIAGNOSIS — M20.002 DEFORMITY OF FINGER OF LEFT HAND: Primary | ICD-10-CM

## 2020-06-01 DIAGNOSIS — S61.422D LACERATION OF LEFT HAND WITH FOREIGN BODY, SUBSEQUENT ENCOUNTER: ICD-10-CM

## 2020-06-01 PROCEDURE — 97530 THERAPEUTIC ACTIVITIES: CPT | Performed by: OCCUPATIONAL THERAPIST

## 2020-06-01 PROCEDURE — 97140 MANUAL THERAPY 1/> REGIONS: CPT | Performed by: OCCUPATIONAL THERAPIST

## 2020-06-01 PROCEDURE — 97110 THERAPEUTIC EXERCISES: CPT | Performed by: OCCUPATIONAL THERAPIST

## 2020-06-01 NOTE — PROGRESS NOTES
OccupationalTherapy Daily Progress Note        Patient: Eddi Richard   : 1965  Diagnosis/ICD-10 Code:  Deformity of finger of left hand [M20.002]  Referring practitioner: Damien Felix MD  Date of Initial Visit: Type: THERAPY  Noted: 1/15/2020  Today's Date: 2020  Patient seen for 10 sessions            Subjective pt reports that he was repairing the brake lining on his brother car and bumped his fingers and it really hurt   Pt both hands have ground in dirt and grease it appears that the pt has been active and using both hands     Objective   Pt displays full flexion and extension of his digits   He has full  and prehension   He has good wrist strength and mobility   Continue to discuss desensitizing at home   See Exercise, Manual, and Modality Logs for complete treatment.       Assessment/Plan    Progress per Plan of Care           Timed Codes        Manual Therapy:    30     mins  56199;    Therapeutic Exercise:    15     mins  66151;     Neuromuscular Lizette:        mins  19082;    Therapeutic Activity:     15     mins  25028;      Ultrasound:          mins  47485;    Community/ work         mins  05138  Self Care/ Stanley         mins  28108  Sensory Re-Int.                  mins   15243  Cognitve Re-train         mins  76701     Un-timed Codes  Electrical Stimulation:         mins 9 95010 ( );      Timed Treatment:   60   mins   Total Treatment:    60   mins    Duyen Smalls OT  Occupational Therapist

## 2020-06-05 ENCOUNTER — TREATMENT (OUTPATIENT)
Dept: PHYSICAL THERAPY | Facility: CLINIC | Age: 55
End: 2020-06-05

## 2020-06-05 DIAGNOSIS — M20.002 DEFORMITY OF FINGER OF LEFT HAND: Primary | ICD-10-CM

## 2020-06-05 PROCEDURE — 97110 THERAPEUTIC EXERCISES: CPT | Performed by: OCCUPATIONAL THERAPIST

## 2020-06-05 PROCEDURE — 97140 MANUAL THERAPY 1/> REGIONS: CPT | Performed by: OCCUPATIONAL THERAPIST

## 2020-06-05 NOTE — PROGRESS NOTES
OccupationalTherapy Daily Progress Note        Patient: Eddi Richard   : 1965  Diagnosis/ICD-10 Code:  Deformity of finger of left hand [M20.002]  Referring practitioner: Damien Felix MD  Date of Initial Visit: Type: THERAPY  Noted: 1/15/2020  Today's Date: 2020  Patient seen for 11 sessions              Subjective  Pt hands clean and area of lacerations healing but areas still red and appear abrasive and tendeer     Objective   Program consists of manual scar massage and blocking exercises at each joint level of all 3 affected digits   increasing flexion of DIP and PIP   Issued strapping for HEP to increase the flexion of the PIP and DIP    See Exercise, Manual, and Modality Logs for complete treatment.       Assessment/Plan    Progress per Plan of Care           Timed Codes        Manual Therapy:    30     mins  25440;    Therapeutic Exercise:    15     mins  39537;     Neuromuscular Lizette:        mins  08294;    Therapeutic Activity:          mins  61308;      Ultrasound:          mins  64970;    Community/ work         mins  57092  Self Care/ Stanley         mins  92343  Sensory Re-Int.                  mins   84132  Cognitve Re-train         mins  48279     Un-timed Codes  Electrical Stimulation:         mins 9 48550 ( );      Timed Treatment:  45    mins   Total Treatment:     45   mins    Duyen Smalls OT  Occupational Therapist

## 2021-01-06 ENCOUNTER — TRANSCRIBE ORDERS (OUTPATIENT)
Dept: ADMINISTRATIVE | Facility: HOSPITAL | Age: 56
End: 2021-01-06

## 2021-01-06 ENCOUNTER — HOSPITAL ENCOUNTER (OUTPATIENT)
Dept: GENERAL RADIOLOGY | Facility: HOSPITAL | Age: 56
Discharge: HOME | End: 2021-01-06

## 2021-01-06 DIAGNOSIS — Z02.71 ENCOUNTER FOR DISABILITY DETERMINATION: Primary | ICD-10-CM

## 2021-01-06 DIAGNOSIS — Z02.71 ENCOUNTER FOR DISABILITY DETERMINATION: ICD-10-CM

## 2021-01-06 PROCEDURE — 72100 X-RAY EXAM L-S SPINE 2/3 VWS: CPT

## 2021-05-18 ENCOUNTER — HOSPITAL ENCOUNTER (EMERGENCY)
Facility: HOSPITAL | Age: 56
Discharge: HOME | End: 2021-05-18
Admitting: EMERGENCY MEDICINE

## 2021-05-18 VITALS
DIASTOLIC BLOOD PRESSURE: 70 MMHG | RESPIRATION RATE: 18 BRPM | OXYGEN SATURATION: 98 % | HEIGHT: 68 IN | WEIGHT: 168.65 LBS | HEART RATE: 81 BPM | TEMPERATURE: 98 F | SYSTOLIC BLOOD PRESSURE: 118 MMHG | BODY MASS INDEX: 25.56 KG/M2

## 2021-05-18 DIAGNOSIS — T23.031A BURN OF MULTIPLE FINGERS OF RIGHT HAND EXCLUDING THUMB, UNSPECIFIED BURN DEGREE, INITIAL ENCOUNTER: Primary | ICD-10-CM

## 2021-05-18 DIAGNOSIS — M79.641 RIGHT HAND PAIN: ICD-10-CM

## 2021-05-18 PROCEDURE — 96372 THER/PROPH/DIAG INJ SC/IM: CPT

## 2021-05-18 PROCEDURE — 99283 EMERGENCY DEPT VISIT LOW MDM: CPT

## 2021-05-18 PROCEDURE — 25010000002 MORPHINE PER 10 MG: Performed by: PHYSICIAN ASSISTANT

## 2021-05-18 RX ORDER — MORPHINE SULFATE 4 MG/ML
4 INJECTION, SOLUTION INTRAMUSCULAR; INTRAVENOUS ONCE
Status: COMPLETED | OUTPATIENT
Start: 2021-05-18 | End: 2021-05-18

## 2021-05-18 RX ORDER — TRAMADOL HYDROCHLORIDE 50 MG/1
50 TABLET ORAL EVERY 6 HOURS PRN
Qty: 10 TABLET | Refills: 0 | Status: SHIPPED | OUTPATIENT
Start: 2021-05-18

## 2021-05-18 RX ADMIN — MORPHINE SULFATE 4 MG: 4 INJECTION INTRAVENOUS at 14:54

## 2021-05-18 RX ADMIN — SILVER SULFADIAZINE 1 APPLICATION: 10 CREAM TOPICAL at 14:55

## 2021-07-09 ENCOUNTER — OFFICE (AMBULATORY)
Dept: URBAN - METROPOLITAN AREA CLINIC 64 | Facility: CLINIC | Age: 56
End: 2021-07-09
Payer: COMMERCIAL

## 2021-07-09 VITALS — WEIGHT: 169 LBS | HEIGHT: 69 IN

## 2021-07-09 DIAGNOSIS — Z86.010 PERSONAL HISTORY OF COLONIC POLYPS: ICD-10-CM

## 2021-07-09 DIAGNOSIS — R13.10 DYSPHAGIA, UNSPECIFIED: ICD-10-CM

## 2021-07-09 DIAGNOSIS — R11.2 NAUSEA WITH VOMITING, UNSPECIFIED: ICD-10-CM

## 2021-07-09 PROCEDURE — 99214 OFFICE O/P EST MOD 30 MIN: CPT | Performed by: NURSE PRACTITIONER

## 2021-07-20 ENCOUNTER — ON CAMPUS - OUTPATIENT (AMBULATORY)
Dept: URBAN - METROPOLITAN AREA HOSPITAL 2 | Facility: HOSPITAL | Age: 56
End: 2021-07-20
Payer: COMMERCIAL

## 2021-07-20 VITALS
DIASTOLIC BLOOD PRESSURE: 70 MMHG | SYSTOLIC BLOOD PRESSURE: 105 MMHG | SYSTOLIC BLOOD PRESSURE: 110 MMHG | DIASTOLIC BLOOD PRESSURE: 75 MMHG | SYSTOLIC BLOOD PRESSURE: 114 MMHG | DIASTOLIC BLOOD PRESSURE: 66 MMHG | TEMPERATURE: 98 F | DIASTOLIC BLOOD PRESSURE: 76 MMHG | OXYGEN SATURATION: 97 % | WEIGHT: 165 LBS | DIASTOLIC BLOOD PRESSURE: 68 MMHG | HEART RATE: 91 BPM | SYSTOLIC BLOOD PRESSURE: 104 MMHG | DIASTOLIC BLOOD PRESSURE: 77 MMHG | OXYGEN SATURATION: 99 % | OXYGEN SATURATION: 96 % | OXYGEN SATURATION: 95 % | HEART RATE: 79 BPM | RESPIRATION RATE: 15 BRPM | RESPIRATION RATE: 16 BRPM | RESPIRATION RATE: 18 BRPM | HEART RATE: 82 BPM | SYSTOLIC BLOOD PRESSURE: 111 MMHG | HEART RATE: 83 BPM | HEIGHT: 69 IN | HEART RATE: 87 BPM | RESPIRATION RATE: 17 BRPM | SYSTOLIC BLOOD PRESSURE: 113 MMHG

## 2021-07-20 DIAGNOSIS — R13.10 DYSPHAGIA, UNSPECIFIED: ICD-10-CM

## 2021-07-20 DIAGNOSIS — K21.00 GASTRO-ESOPHAGEAL REFLUX DISEASE WITH ESOPHAGITIS, WITHOUT B: ICD-10-CM

## 2021-07-20 DIAGNOSIS — K22.2 ESOPHAGEAL OBSTRUCTION: ICD-10-CM

## 2021-07-20 DIAGNOSIS — K44.9 DIAPHRAGMATIC HERNIA WITHOUT OBSTRUCTION OR GANGRENE: ICD-10-CM

## 2021-07-20 PROBLEM — K20.80 OTHER ESOPHAGITIS WITHOUT BLEEDING: Status: ACTIVE | Noted: 2021-07-20

## 2021-07-20 PROCEDURE — 43248 EGD GUIDE WIRE INSERTION: CPT | Performed by: INTERNAL MEDICINE

## 2021-07-20 RX ORDER — PANTOPRAZOLE SODIUM 40 MG/1
40 TABLET, DELAYED RELEASE ORAL
Qty: 90 | Refills: 4 | Status: ACTIVE
Start: 2021-07-20

## 2021-09-22 ENCOUNTER — ON CAMPUS - OUTPATIENT (AMBULATORY)
Dept: URBAN - METROPOLITAN AREA HOSPITAL 2 | Facility: HOSPITAL | Age: 56
End: 2021-09-22
Payer: COMMERCIAL

## 2021-09-22 VITALS
WEIGHT: 166 LBS | HEART RATE: 70 BPM | RESPIRATION RATE: 17 BRPM | TEMPERATURE: 98 F | HEART RATE: 87 BPM | SYSTOLIC BLOOD PRESSURE: 111 MMHG | SYSTOLIC BLOOD PRESSURE: 115 MMHG | DIASTOLIC BLOOD PRESSURE: 67 MMHG | SYSTOLIC BLOOD PRESSURE: 110 MMHG | OXYGEN SATURATION: 97 % | RESPIRATION RATE: 16 BRPM | OXYGEN SATURATION: 98 % | DIASTOLIC BLOOD PRESSURE: 71 MMHG | OXYGEN SATURATION: 96 % | DIASTOLIC BLOOD PRESSURE: 68 MMHG | HEART RATE: 86 BPM | DIASTOLIC BLOOD PRESSURE: 75 MMHG | SYSTOLIC BLOOD PRESSURE: 128 MMHG | DIASTOLIC BLOOD PRESSURE: 88 MMHG | OXYGEN SATURATION: 99 % | SYSTOLIC BLOOD PRESSURE: 129 MMHG | DIASTOLIC BLOOD PRESSURE: 74 MMHG | OXYGEN SATURATION: 100 % | SYSTOLIC BLOOD PRESSURE: 126 MMHG | HEIGHT: 69 IN | HEART RATE: 75 BPM | RESPIRATION RATE: 18 BRPM

## 2021-09-22 DIAGNOSIS — K21.00 GASTRO-ESOPHAGEAL REFLUX DISEASE WITH ESOPHAGITIS, WITHOUT B: ICD-10-CM

## 2021-09-22 DIAGNOSIS — R13.10 DYSPHAGIA, UNSPECIFIED: ICD-10-CM

## 2021-09-22 DIAGNOSIS — K22.2 ESOPHAGEAL OBSTRUCTION: ICD-10-CM

## 2021-09-22 DIAGNOSIS — K44.9 DIAPHRAGMATIC HERNIA WITHOUT OBSTRUCTION OR GANGRENE: ICD-10-CM

## 2021-09-22 PROCEDURE — 43235 EGD DIAGNOSTIC BRUSH WASH: CPT | Performed by: INTERNAL MEDICINE

## 2021-09-22 PROCEDURE — 43450 DILATE ESOPHAGUS 1/MULT PASS: CPT | Performed by: INTERNAL MEDICINE

## 2021-09-22 RX ORDER — PANTOPRAZOLE SODIUM 40 MG/1
40 TABLET, DELAYED RELEASE ORAL
Qty: 90 | Refills: 4 | Status: ACTIVE
Start: 2021-07-20

## 2024-08-05 ENCOUNTER — HOSPITAL ENCOUNTER (EMERGENCY)
Facility: HOSPITAL | Age: 59
Discharge: HOME | End: 2024-08-05
Admitting: EMERGENCY MEDICINE
Payer: COMMERCIAL

## 2024-08-05 ENCOUNTER — APPOINTMENT (OUTPATIENT)
Dept: GENERAL RADIOLOGY | Facility: HOSPITAL | Age: 59
End: 2024-08-05
Payer: COMMERCIAL

## 2024-08-05 VITALS
WEIGHT: 164.46 LBS | TEMPERATURE: 98 F | BODY MASS INDEX: 24.93 KG/M2 | OXYGEN SATURATION: 95 % | SYSTOLIC BLOOD PRESSURE: 120 MMHG | DIASTOLIC BLOOD PRESSURE: 62 MMHG | HEIGHT: 68 IN | HEART RATE: 92 BPM | RESPIRATION RATE: 18 BRPM

## 2024-08-05 DIAGNOSIS — S61.411A LACERATION OF RIGHT HAND WITHOUT FOREIGN BODY, INITIAL ENCOUNTER: Primary | ICD-10-CM

## 2024-08-05 PROCEDURE — 90715 TDAP VACCINE 7 YRS/> IM: CPT | Performed by: NURSE PRACTITIONER

## 2024-08-05 PROCEDURE — 73130 X-RAY EXAM OF HAND: CPT

## 2024-08-05 PROCEDURE — 90471 IMMUNIZATION ADMIN: CPT | Performed by: NURSE PRACTITIONER

## 2024-08-05 PROCEDURE — 99283 EMERGENCY DEPT VISIT LOW MDM: CPT

## 2024-08-05 PROCEDURE — 25010000002 TETANUS-DIPHTH-ACELL PERTUSSIS 5-2.5-18.5 LF-MCG/0.5 SUSPENSION PREFILLED SYRINGE: Performed by: NURSE PRACTITIONER

## 2024-08-05 RX ORDER — DIAPER,BRIEF,INFANT-TODD,DISP
1 EACH MISCELLANEOUS ONCE
Status: COMPLETED | OUTPATIENT
Start: 2024-08-05 | End: 2024-08-05

## 2024-08-05 RX ADMIN — BACITRACIN 0.9 G: 500 OINTMENT TOPICAL at 11:22

## 2024-08-05 RX ADMIN — TETANUS TOXOID, REDUCED DIPHTHERIA TOXOID AND ACELLULAR PERTUSSIS VACCINE, ADSORBED 0.5 ML: 5; 2.5; 8; 8; 2.5 SUSPENSION INTRAMUSCULAR at 11:23

## 2024-08-05 NOTE — ED PROVIDER NOTES
Subjective   History of Present Illness  Patient is a 58-year-old white male who presents today with complaints of a laceration to his right hand.  He states he was working in the bathroom yesterday when he cut his hand on a piece of sheet metal.  States he cleaned it and wrapped it up.  He is unsure of his last tetanus booster.  Complains of mild discomfort.  He denies numbness tingling or weakness distal to the injury.  He denies any other injury or complaint.      Review of Systems   Skin:  Positive for wound.        Right hand laceration   Neurological:  Negative for weakness and numbness.       Past Medical History:   Diagnosis Date    Back injury        No Known Allergies    Past Surgical History:   Procedure Laterality Date    HAND SURGERY Left        Family History   Problem Relation Age of Onset    Heart disease Mother     Hypertension Mother     Heart disease Sister     Hypertension Sister        Social History     Socioeconomic History    Marital status:    Tobacco Use    Smoking status: Every Day     Current packs/day: 1.00     Types: Cigarettes    Smokeless tobacco: Never   Substance and Sexual Activity    Alcohol use: Yes    Drug use: Defer    Sexual activity: Defer           Objective   Physical Exam  Vital signs and triage nurse note reviewed.  Constitutional: Awake, alert; well-developed and well-nourished. No acute distress is noted.  Musculoskeletal: Independent range of motion of all extremities with no palpable tenderness or edema.   Skin: Flesh tone, warm, dry.  Small 0.5 cm superficial well-approximated laceration noted over the ulnar aspect of the right hand.  There is no active bleeding or drainage.  No surrounding redness or ecchymosis.  No underlying bony tenderness.  There is good range of motion of all fingers of the right hand against resistance with flexion and extension.    Procedures           ED Course      Labs Reviewed - No data to display  XR Hand 3+ View Right    Result  Date: 8/5/2024  Impression: No evidence of acute fracture or radiopaque foreign body. Electronically Signed: Branden Lu MD  8/5/2024 12:06 PM EDT  Workstation ID: [id number]   Medications   Tetanus-Diphth-Acell Pertussis (BOOSTRIX) injection 0.5 mL (0.5 mL Intramuscular Given 8/5/24 1123)   bacitracin ointment 0.9 g (0.9 g Topical Given 8/5/24 1122)                                            Medical Decision Making  Patient presents today with the above complaint.    He had the above exam and evaluation.  X-rays obtained was found to be negative for fracture or radiopaque foreign body.  Patient's wound is fairly superficial appearing and already well-approximated and without bleeding.  Nothing suturable at this time.  His wound was cleansed and dressed.  He is given a tetanus booster.    Findings were discussed with him.  He will be discharged home and instructed follow-up with primary care provider as needed.  He was given warning signs for prompt return to the ED and voiced understanding.      Amount and/or Complexity of Data Reviewed  Radiology: ordered.    Risk  OTC drugs.  Prescription drug management.        Final diagnoses:   Laceration of right hand without foreign body, initial encounter       ED Disposition  ED Disposition       ED Disposition   Discharge    Condition   Stable    Comment   --               Manjula Canales, APRN  [address] [facility] [place] [facility]  [phone]      As needed         Medication List      No changes were made to your prescriptions during this visit.            Deysi Polanco, CADEN  08/05/24 7031

## 2024-08-05 NOTE — DISCHARGE INSTRUCTIONS
Keep wound clean, dry and covered.  Apply antibiotic ointment twice daily.  Tylenol if needed for pain.  Ultrasound infection.  Follow-up with her primary care provider as needed.  Return for new or worsening symptoms.